# Patient Record
Sex: MALE | Race: OTHER | URBAN - METROPOLITAN AREA
[De-identification: names, ages, dates, MRNs, and addresses within clinical notes are randomized per-mention and may not be internally consistent; named-entity substitution may affect disease eponyms.]

---

## 2023-09-21 ENCOUNTER — OUTPATIENT (OUTPATIENT)
Dept: OUTPATIENT SERVICES | Facility: HOSPITAL | Age: 43
LOS: 1 days | Discharge: HOME | End: 2023-09-21
Payer: COMMERCIAL

## 2023-09-21 VITALS
SYSTOLIC BLOOD PRESSURE: 143 MMHG | DIASTOLIC BLOOD PRESSURE: 81 MMHG | HEIGHT: 64 IN | TEMPERATURE: 99 F | HEART RATE: 64 BPM | RESPIRATION RATE: 17 BRPM | OXYGEN SATURATION: 97 % | WEIGHT: 175.05 LBS

## 2023-09-21 VITALS
HEART RATE: 73 BPM | OXYGEN SATURATION: 96 % | RESPIRATION RATE: 16 BRPM | SYSTOLIC BLOOD PRESSURE: 115 MMHG | DIASTOLIC BLOOD PRESSURE: 68 MMHG

## 2023-09-21 PROCEDURE — 29888 ARTHRS AID ACL RPR/AGMNTJ: CPT | Mod: 80,RT

## 2023-09-21 PROCEDURE — 29880 ARTHRS KNE SRG MNISECTMY M&L: CPT | Mod: 80,RT

## 2023-09-21 DEVICE — IMPLANTABLE DEVICE: Type: IMPLANTABLE DEVICE | Site: RIGHT | Status: FUNCTIONAL

## 2023-09-21 DEVICE — SCREW BC 8X20MM: Type: IMPLANTABLE DEVICE | Site: RIGHT | Status: FUNCTIONAL

## 2023-09-21 RX ORDER — FENTANYL CITRATE 50 UG/ML
50 INJECTION INTRAVENOUS
Refills: 0 | Status: DISCONTINUED | OUTPATIENT
Start: 2023-09-21 | End: 2023-09-25

## 2023-09-21 RX ORDER — LABETALOL HCL 100 MG
5 TABLET ORAL
Refills: 0 | Status: DISCONTINUED | OUTPATIENT
Start: 2023-09-21 | End: 2023-10-05

## 2023-09-21 RX ORDER — SODIUM CHLORIDE 9 MG/ML
1000 INJECTION, SOLUTION INTRAVENOUS
Refills: 0 | Status: DISCONTINUED | OUTPATIENT
Start: 2023-09-21 | End: 2023-09-25

## 2023-09-21 RX ORDER — HYDROMORPHONE HYDROCHLORIDE 2 MG/ML
0.5 INJECTION INTRAMUSCULAR; INTRAVENOUS; SUBCUTANEOUS
Refills: 0 | Status: DISCONTINUED | OUTPATIENT
Start: 2023-09-21 | End: 2023-09-25

## 2023-09-21 RX ORDER — ONDANSETRON 8 MG/1
4 TABLET, FILM COATED ORAL ONCE
Refills: 0 | Status: COMPLETED | OUTPATIENT
Start: 2023-09-21 | End: 2023-09-21

## 2023-09-21 RX ADMIN — HYDROMORPHONE HYDROCHLORIDE 0.5 MILLIGRAM(S): 2 INJECTION INTRAMUSCULAR; INTRAVENOUS; SUBCUTANEOUS at 09:56

## 2023-09-21 RX ADMIN — ONDANSETRON 4 MILLIGRAM(S): 8 TABLET, FILM COATED ORAL at 10:54

## 2023-09-21 RX ADMIN — Medication 5 MILLIGRAM(S): at 10:39

## 2023-09-21 RX ADMIN — FENTANYL CITRATE 50 MICROGRAM(S): 50 INJECTION INTRAVENOUS at 10:29

## 2023-09-21 RX ADMIN — FENTANYL CITRATE 50 MICROGRAM(S): 50 INJECTION INTRAVENOUS at 10:44

## 2023-09-21 RX ADMIN — HYDROMORPHONE HYDROCHLORIDE 0.5 MILLIGRAM(S): 2 INJECTION INTRAMUSCULAR; INTRAVENOUS; SUBCUTANEOUS at 10:04

## 2023-09-21 RX ADMIN — HYDROMORPHONE HYDROCHLORIDE 0.5 MILLIGRAM(S): 2 INJECTION INTRAMUSCULAR; INTRAVENOUS; SUBCUTANEOUS at 10:19

## 2023-09-21 RX ADMIN — HYDROMORPHONE HYDROCHLORIDE 0.5 MILLIGRAM(S): 2 INJECTION INTRAMUSCULAR; INTRAVENOUS; SUBCUTANEOUS at 09:41

## 2023-09-21 RX ADMIN — SODIUM CHLORIDE 100 MILLILITER(S): 9 INJECTION, SOLUTION INTRAVENOUS at 09:41

## 2023-09-21 NOTE — ASU DISCHARGE PLAN (ADULT/PEDIATRIC) - ASU DC SPECIAL INSTRUCTIONSFT
- Pain Control: please take pain medications as needed and as prescribed by Dr Mason, take tylenol/motrin over the counter for mild pain. Follow instructions on packaging.  - Non-weight bearing on Right lower extremity, use knee immobilizer and crutches.   - Elevate left lower extremity as needed to reduce pain  - Keep dressing/splint clean, dry, and intact until follow up  - Follow up with Dr. Mason as Outpatient after discharge from the hospital for your scheduled appointment. Please call office for appointment. - Pain Control: please take pain medications as needed and as prescribed by Dr Mason, take tylenol/motrin over the counter for mild pain. Follow instructions on packaging.  - Non-weight bearing on Right lower extremity, use knee immobilizer and crutches.   - Elevate right lower extremity as needed to reduce pain  - Keep dressing/splint clean, dry, and intact until follow up  - Follow up with Dr. Mason as Outpatient after discharge from the hospital for your scheduled appointment. Please call office for appointment.

## 2023-09-21 NOTE — ASU DISCHARGE PLAN (ADULT/PEDIATRIC) - CARE PROVIDER_API CALL
Miguel Mason  Orthopaedic Surgery  [address] [place] [address]  Phone: [phone]  Fax: [phone]  Follow Up Time:

## 2023-09-21 NOTE — ASU DISCHARGE PLAN (ADULT/PEDIATRIC) - NPI NUMBER (FOR SYSADMIN USE ONLY) :
Patient seen for nutrition follow-up. Per chart, pt admitted with RLE arterial occlusion, s/p embolectomy of right AT and peroneal artery, RLE fasciotomies, acute left MCA thrombosis  s/p successful neuro IR cerebral angio. PMHx of atherosclerosis, spinal surgery (10/2018) HTN, afib, Hodgkin lymphoma (1975), GERD, OME, spinal stenosis and CVA. Official time of discharge unknown at this time, however pt stated he may be discharged today to a rehab facility.     Source:  Patient    Diet : Regular    Patient reports fair PO intake and fair appetite that is improving. Pt had just finished breakfast and ate cereal and juice, planning to eat banana later in day. Pt reports no GI distress at this time. Pt dislikes vanilla ensure but is amenable to trying the chocolate flavor, and to try a health shake. PO intake was encouraged and importance of protein consumption while healing from leg surgery was discussed. Pt was offered snacks to encourage PO/ protein intake and stated he would like to receive a peanut butter and jelly sandwich for lunch, and a banana with peanut butter as an afternoon snack.    PO intake :     Source for PO intake:     Enteral /Parenteral Nutrition:       Daily Weight   Weight in k.2 (11-14)     Pertinent Medications: MEDICATIONS  (STANDING):  aspirin enteric coated 81 milliGRAM(s) Oral daily  ATENolol  Tablet 25 milliGRAM(s) Oral daily  docusate sodium 100 milliGRAM(s) Oral daily  fluticasone propionate 50 MICROgram(s)/spray Nasal Spray 1 Spray(s) Both Nostrils daily  NIFEdipine XL 60 milliGRAM(s) Oral daily  pantoprazole    Tablet 40 milliGRAM(s) Oral before breakfast  polyethylene glycol 3350 17 Gram(s) Oral daily  senna 2 Tablet(s) Oral at bedtime  simvastatin 20 milliGRAM(s) Oral at bedtime  sodium chloride 2% . 1000 milliLiter(s) (75 mL/Hr) IV Continuous <Continuous>  tamsulosin 0.4 milliGRAM(s) Oral at bedtime  warfarin 2 milliGRAM(s) Oral once    MEDICATIONS  (PRN):  acetaminophen   Tablet .. 650 milliGRAM(s) Oral every 6 hours PRN Mild Pain (1 - 3)  oxyCODONE    IR 5 milliGRAM(s) Oral every 4 hours PRN Moderate Pain (4 - 6)  oxyCODONE    IR 10 milliGRAM(s) Oral every 4 hours PRN Severe Pain (7 - 10)    Pertinent Labs: -15 @ 05:21: Na 139, BUN 11, Cr 0.70, <H>, K+ 4.1  11-14 @ 23:35: Na 139, BUN 10, Cr 0.75, <H>, K+ 4.4  - HgbA1C 5.6    Skin per nursing documentation: wound on right leg, no pressure injuries   Edema: none noted    Estimated Needs:   [] no change since previous assessment  [x] recalculated:  Current body weight 95.2kg  20-25 kcal/kg= 1904- 2380kcal/day  1.0-1.2 g PRO/kg=  95-114g/day    Previous Nutrition Diagnosis: Increased protein and energy needs- previously being addressed by Ensure x3 daily until . Ensures currently discontinued    Recommend  1) Low sodium diet for high blood pressure.  2) Chocolate Ensure x1 and vanilla health shake x1 daily.     Monitoring and Evaluation:   Continue to monitor Nutritional intake, Tolerance to diet prescription, weights, labs, skin integrity    RD remains available upon request and will follow up per protocol Patient seen for nutrition follow-up. Per chart, pt admitted with RLE arterial occlusion, s/p embolectomy of right AT and peroneal artery, RLE fasciotomies, acute left MCA thrombosis  s/p successful neuro IR cerebral angio. PMHx of atherosclerosis, spinal surgery (10/2018) HTN, afib, Hodgkin lymphoma (1975), GERD, MOE, spinal stenosis and CVA. Official time of discharge unknown at this time, however pt stated he may be discharged today to a rehab facility.     Source:  Patient    Diet : Regular    Patient reports fair PO intake and fair appetite that is improving. Pt had just finished breakfast and ate cereal and juice, planning to eat banana later in day. Pt reports no GI distress at this time. Pt dislikes vanilla ensure but is amenable to trying the chocolate flavor, and to try a health shake. PO intake was encouraged and importance of protein consumption while healing from leg surgery was discussed. Food preferences obtained, will be honored as feasible.     PO intake : ~50%    Daily Weight   Weight in k.2 (11-14)   Weight in k.5 (11-7)    Pertinent Medications: MEDICATIONS  (STANDING):  aspirin enteric coated 81 milliGRAM(s) Oral daily  ATENolol  Tablet 25 milliGRAM(s) Oral daily  docusate sodium 100 milliGRAM(s) Oral daily  fluticasone propionate 50 MICROgram(s)/spray Nasal Spray 1 Spray(s) Both Nostrils daily  NIFEdipine XL 60 milliGRAM(s) Oral daily  pantoprazole    Tablet 40 milliGRAM(s) Oral before breakfast  polyethylene glycol 3350 17 Gram(s) Oral daily  senna 2 Tablet(s) Oral at bedtime  simvastatin 20 milliGRAM(s) Oral at bedtime  sodium chloride 2% . 1000 milliLiter(s) (75 mL/Hr) IV Continuous <Continuous>  tamsulosin 0.4 milliGRAM(s) Oral at bedtime  warfarin 2 milliGRAM(s) Oral once    MEDICATIONS  (PRN):  acetaminophen   Tablet .. 650 milliGRAM(s) Oral every 6 hours PRN Mild Pain (1 - 3)  oxyCODONE    IR 5 milliGRAM(s) Oral every 4 hours PRN Moderate Pain (4 - 6)  oxyCODONE    IR 10 milliGRAM(s) Oral every 4 hours PRN Severe Pain (7 - 10)    Pertinent Labs: 11-15 @ 05:21: Na 139, BUN 11, Cr 0.70, <H>, K+ 4.1   @ 23:35: Na 139, BUN 10, Cr 0.75, <H>, K+ 4.4   HgbA1C 5.6    Skin per nursing documentation: wound on right leg, no pressure injuries   Edema: none noted    Estimated Needs:   [] no change since previous assessment  [x] recalculated:  Ideal body weight 70kg  25-30 kcal/kg= 1750- 2100kcal/day  1.2-1.4 g PRO/kg=  84-98g/day    Previous Nutrition Diagnosis: Increased protein and energy needs- previously being addressed by Ensure x3 daily until . Ensures currently discontinued, recommend to restart supplementation    Recommend  1) Continue current diet, continue to monitor need for low sodium diet.  2) Chocolate Ensure x1 and vanilla health shake x1 daily.   3) Continue to encourage intake of nutrient dense foods.     Monitoring and Evaluation:   Continue to monitor Nutritional intake, Tolerance to diet prescription, weights, labs, skin integrity    RD remains available upon request and will follow up per protocol  Vika Vallecillo Dietetic Intern, Pager: [phone] [[phone]]

## 2023-09-21 NOTE — BRIEF OPERATIVE NOTE - NSICDXBRIEFPROCEDURE_GEN_ALL_CORE_FT
PROCEDURES:  ACL reconstruction 21-Sep-2023 09:38:09 Right ACL recon w/ Achilles Reinaldo Anderson

## 2023-09-21 NOTE — H&P ADULT - HISTORY OF PRESENT ILLNESS
[facility] 43[facility]. Denies any numbness or tingling. Denies having any other pain elsewhere. No other orthopedic concerns at this time.            Allergy Status Unknown      PHYSICAL EXAM:  T(C): 37.3 (09-21-23 @ 06:52), Max: 37.3 (09-21-23 @ 06:52)  HR: 64 (09-21-23 @ 06:52) (64 - 64)  BP: 143/81 (09-21-23 @ 06:52) (143/81 - 143/81)  RR: 17 (09-21-23 @ 06:52) (17 - 17)  SpO2: 97% (09-21-23 @ 06:52) (97% - 97%)    Gen: NAD, Resting comfortably    RLE:  Skin intact, no erythema or ecchymosis  No bony tenderness to palpation  +EHL/FHL/TA/GSC  +SILT L3-S1  + DP  Compartments soft and compressible  No calf tenderness      A/P: 43M w/ R ACL tear    Plan:    -Plan for surgical intervention R ACL recon  -NPO   -Plan for DC home today

## 2023-09-21 NOTE — ASU DISCHARGE PLAN (ADULT/PEDIATRIC) - NS MD DC FALL RISK RISK
For information on Fall & Injury Prevention, visit: [email or web address] OR  [email or web address] OR  https://www.cdc.gov/steadi/patient.html

## 2023-09-29 DIAGNOSIS — S83.241A OTHER TEAR OF MEDIAL MENISCUS, CURRENT INJURY, RIGHT KNEE, INITIAL ENCOUNTER: ICD-10-CM

## 2023-09-29 DIAGNOSIS — M94.261 CHONDROMALACIA, RIGHT KNEE: ICD-10-CM
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
## 2023-09-29 DIAGNOSIS — S83.511A SPRAIN OF ANTERIOR CRUCIATE LIGAMENT OF RIGHT KNEE, INITIAL ENCOUNTER: ICD-10-CM
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
## 2023-09-29 DIAGNOSIS — S83.281A OTHER TEAR OF LATERAL MENISCUS, CURRENT INJURY, RIGHT KNEE, INITIAL ENCOUNTER: ICD-10-CM

## (undated) DEVICE — SOL IRR LR 3000ML

## (undated) DEVICE — PACK MINOR WITH LAP

## (undated) DEVICE — SUT VICRYL 0 27" CT-2 UNDYED

## (undated) DEVICE — NDL SPINAL 18G X 3.5" (PINK)

## (undated) DEVICE — WARMING BLANKET UPPER ADULT

## (undated) DEVICE — SUT VICRYL 2-0 27" CT-2 UNDYED

## (undated) DEVICE — VENODYNE/SCD SLEEVE CALF MEDIUM

## (undated) DEVICE — DEPUY ACL GRAFT KNIFE 10MM

## (undated) DEVICE — DRSG COBAN 4"

## (undated) DEVICE — S&N ARTHROCARE WAND COBLATION WEREWOLF FLOW 90

## (undated) DEVICE — PACK KNEE ARTHROSCOPY

## (undated) DEVICE — FRA-ESU BOVIE FORCE FX F3B25824A: Type: DURABLE MEDICAL EQUIPMENT

## (undated) DEVICE — LAP PAD 18 X 18"

## (undated) DEVICE — ELCTR BOVIE PENCIL HANDPIECE

## (undated) DEVICE — GLV 8 PROTEXIS (WHITE)

## (undated) DEVICE — BLADE SURGICAL #15 CARBON

## (undated) DEVICE — SUT FIBERWIRE #2 38" STRAND 1 BLUE T-5 TAPER

## (undated) DEVICE — GLV 7.5 PROTEXIS (WHITE)

## (undated) DEVICE — TOURNIQUET ESMARK 6"

## (undated) DEVICE — SUT HISTOACRYL BLUE

## (undated) DEVICE — ARTHREX KIT ACL TRANSTIBIAL W SAW BLADE

## (undated) DEVICE — SAW BLADE LINVATEC SAGITTAL MIC 9.5X25.5X0.4MM

## (undated) DEVICE — DRSG STERISTRIPS 0.5 X 4"

## (undated) DEVICE — SUT MONOCRYL 3-0 18" PS-2 UNDYED

## (undated) DEVICE — SOL IRR POUR NS 0.9% 1000ML

## (undated) DEVICE — SHAVER BLADE GREAT WHITE 4.2MM

## (undated) DEVICE — POSITIONER FOAM BUMP FLAT TOP 10X6X4" LRG

## (undated) DEVICE — DRAPE 3/4 SHEET W REINFORCEMENT 56X77"